# Patient Record
(demographics unavailable — no encounter records)

---

## 2017-07-03 NOTE — ED PDOC
HPI:  Headache


Time Seen by Provider: 17 22:23


Chief Complaint (Nursing): Dizziness/Lightheaded


Chief Complaint (Provider): Headache


History Per: Patient


History/Exam Limitations: no limitations


Onset/Duration Of Symptoms: Days (x4), Gradual, Persistent


Current Symptoms Are (Timing): Still Present


Associated Symptoms: Photophobia.  denies: Extremity Weakness


Additional Complaint(s): 





53 year old male presents to ED with complaints of persistent migraine 

headaches x4 days and has a history of HTN (compliant with medication). States 

that the throbbing headaches are present in the frontal and occipital areas of 

the head and are not thunderclap headaches. (+) photophobia and dizziness, (-) 

numbness, weakness, neck stiffness, chest pain, or SOB. 





PCP: Erika Issa





Past Medical History


Reviewed: Historical Data, Nursing Documentation, Vital Signs


Vital Signs: 





 Last Vital Signs











Temp  98.2 F   17 22:22


 


Pulse  73   17 22:59


 


Resp  18   17 22:22


 


BP  117/116 H  17 22:59


 


Pulse Ox  100   17 22:22














- Medical History


PMH: HTN, Hypercholesterolemia, Hyperlipidemia





- Surgical History


Surgical History: Tonsillectomy





- Family History


Family History: States: Unknown Family Hx





- Social History


Alcohol: Occasional





- Home Medications


Home Medications: 


 Ambulatory Orders











 Medication  Instructions  Recorded


 


Acetaminophen with Codeine 1 tab PO Q8H #10 tab 05/26/15





[Tylenol with Codeine No. 3 300  





mg-30 mg]  


 


Naproxen [Naprosyn] 500 mg PO Q12H #20 tab 05/26/15


 


Naproxen 375 mg PO Q8 PRN #21 tab 11/07/15


 


oxyCODONE/Acetaminophen [Percocet 1 ea PO Q6 PRN #10 tab 11/07/15





5/325 mg Tab]  


 


Famotidine [Pepcid] 20 mg PO Q12 #14 tab 12/15/16


 


traMADol [Ultram] 50 mg PO Q6 PRN #12 tab 12/15/16


 


Cyclobenzaprine [Cyclobenzaprine 10 mg PO BID PRN #10 tab 16





HCl]  


 


Naproxen [Naprosyn] 500 mg PO Q12 PRN #14 tablet 16


 


Ketorolac Tromethamine [Toradol] 10 mg PO Q6 #20 tab 17














- Allergies


Allergies/Adverse Reactions: 


 Allergies











Allergy/AdvReac Type Severity Reaction Status Date / Time


 


No Known Allergies Allergy   Verified 12/15/16 19:44














Review of Systems


ROS Statement: Except As Marked, All Systems Reviewed And Found Negative


Constitutional: Negative for: Fever


Eyes: Positive for: Other (Photophobia)


Cardiovascular: Negative for: Chest Pain


Respiratory: Negative for: Shortness of Breath


Neurological: Positive for: Headache, Dizziness.  Negative for: Weakness, 

Numbness





Physical Exam





- Reviewed


Nursing Documentation Reviewed: Yes


Vital Signs Reviewed: Yes





- Physical Exam


Appears: Positive for: Non-toxic, No Acute Distress


Head Exam: Positive for: ATRAUMATIC


Skin: Positive for: Normal Color, Warm, Dry


Eye Exam: Positive for: Normal appearance, EOMI, PERRL


Neck: Positive for: Normal, Painless ROM, Supple


Cardiovascular/Chest: Positive for: Regular Rate, Rhythm.  Negative for: Murmur


Respiratory: Positive for: Normal Breath Sounds.  Negative for: Respiratory 

Distress


Gastrointestinal/Abdominal: Positive for: Soft.  Negative for: Tenderness


Extremity: Positive for: Normal ROM.  Negative for: Deformity


Neurologic/Psych: Positive for: Alert, CNs II-XII (intact), Oriented, 

Cerebellar Tests (intact), Gait (steady).  Negative for: Motor/Sensory Deficits





- Laboratory Results


Result Diagrams: 


 17 22:58





 17 22:58





- ECG


O2 Sat by Pulse Oximetry: 100 (RA)


Pulse Ox Interpretation: Normal





Medical Decision Making


Medical Decision Makin


Initial impression: migraine headache and HTN


Initial plan:


* CT HEAD


* EKG


* Labs


* Trop I


* Reglan 10mg IVP


* Toradol 15mg IVP


* Re-eval





[id number]


CT FINDINGS:


Brain: No intracranial hemorrhage. No mass. Few scattered subtle foci of 

decreased attenuation


within periventricular/subcortical white matter. No definite edema.


Ventricles: No hydrocephalus.


Bones/joints: No acute fracture.


Soft tissues: Unremarkable.


Sinuses: Scattered mild mucosal thickening. Tiny RIGHT maxillary retention cyst.


Mastoid air cells: Minimal partial opacification of RIGHT mastoid, stable.


Orbits: Unremarkable as visualized.


IMPRESSION:


1. Nonspecific white matter changes. Acute infarction may be CT occult within 

first 24 hours. If a


focal deficit persists, consider followup CT or MRI for further evaluation.


2. Incidental/non-acute findings are described above.





0142


Upon re-evaluation, patient is feeling much better and is medically stable and 

ready for discharge. Counseling has been provided and patient is in agreement. 

Return if symptoms persist or acutely worsen.  Instructed to recheck BP in the 

morning before administering AM dose of blood pressure medication. 








Scribe Attestation:


Documented by Siria Delgadillo acting as a scribe for Jacob Brady MD. 





MD Scribe Attestation: 


All medical record entries made by the Scribe were at my direction and 

personally dictated by me. I have reviewed the chart and agree that the record 

accurately reflects my personal performance of the history, physical exam, 

medical decision making, and the department course for this patient. I have 

also personally directed, reviewed, and agree with the discharge instructions 

and disposition.





Disposition





- Clinical Impression


Clinical Impression: 


 Dizziness, Headache, Hypertension





Counseled Patient/Family Regarding: Studies Performed, Diagnosis





- Disposition


Referrals: 


Erika Issa MD [Family Provider] - 


Disposition: Routine/Home


Disposition Time: 01:42


Condition: IMPROVED


Prescriptions: 


Ketorolac Tromethamine [Toradol] 10 mg PO Q6 #20 tab


Instructions:  Migraine Headache (ED), Hypertension (DC)

## 2017-07-03 NOTE — CT
EXAM:

  CT Head Without Intravenous Contrast



CLINICAL HISTORY:

  53 years old, male; Pain; Headache; Headache not specified; Additional info: 

Hypertensive, HA, R/O bleed



TECHNIQUE:

  Axial computed tomography images of the head/brain without intravenous 

contrast.  This CT exam was performed using one or more of the following dose 

reduction techniques:  automated exposure control, adjustment of the mA and/or 

kV according to patient size, and/or use of iterative reconstruction technique.

  Coronal and sagittal reformatted images were created and reviewed.



COMPARISON:

  CT HEAD OR BRAIN W/O CONT 3/26/2014 6:41:25 PM



FINDINGS:

  Brain:  No intracranial hemorrhage.  No mass.  Few scattered subtle foci of 

decreased attenuation within periventricular/subcortical white matter.  No 

definite edema.

  Ventricles:  No hydrocephalus.

  Bones/joints:  No acute fracture.

  Soft tissues:  Unremarkable.

  Sinuses:  Scattered mild mucosal thickening.  Tiny RIGHT maxillary retention 

cyst.

  Mastoid air cells:  Minimal partial opacification of RIGHT mastoid, stable.

  Orbits:  Unremarkable as visualized.



IMPRESSION:     

1.  Nonspecific white matter changes.  Acute infarction may be CT occult within 

first 24 hours.  If a focal deficit persists, consider followup CT or MRI for 

further evaluation.

2.  Incidental/non-acute findings are described above.

## 2017-07-04 NOTE — CARD
--------------- APPROVED REPORT --------------





EKG Measurement

Heart [id number]

SD [id number]

[id number]

[id number]

[id number]



<Conclusion>

Normal sinus rhythm

Normal ECG

## 2017-12-19 NOTE — ED PDOC
HPI: Abdomen


Time Seen by Provider: 12/19/17 07:24


Chief Complaint (Nursing): Abdominal Pain


Chief Complaint (Provider): Abdominal Pain


History Per: Patient


History/Exam Limitations: no limitations


Onset/Duration Of Symptoms: Days (x2)


Current Symptoms Are (Timing): Still Present


Additional Complaint(s): 


Arben Cabezas is a 54 year old male with a past medical history of HTN and 

gastritis who presents to the ED complaining of RLQ pain x2 days. Patient 

states pain began last night and denies history of similar pain. Denies nausea, 

vomiting, diarrhea, and fever. 





PMD: Non-[facility] Provider








Past Medical History


Reviewed: Historical Data, Nursing Documentation, Vital Signs


Vital Signs: 


 Last Vital Signs











Temp  97.6 F   12/19/17 07:26


 


Pulse  82   12/19/17 11:54


 


Resp  16   12/19/17 11:54


 


BP  162/95 H  12/19/17 11:54


 


Pulse Ox  97   12/20/17 13:27














- Medical History


PMH: HTN, Hypercholesterolemia, Hyperlipidemia





- Surgical History


Surgical History: Tonsillectomy





- Family History


Family History: States: Unknown Family Hx





- Home Medications


Home Medications: 


 Ambulatory Orders











 Medication  Instructions  Recorded


 


Acetaminophen with Codeine 1 tab PO Q8H #10 tab 05/26/15





[Tylenol with Codeine No. 3 300  





mg-30 mg]  


 


Naproxen [Naprosyn] 500 mg PO Q12H #20 tab 05/26/15


 


Naproxen 375 mg PO Q8 PRN #21 tab 11/07/15


 


oxyCODONE/Acetaminophen [Percocet 1 ea PO Q6 PRN #10 tab 11/07/15





5/325 mg Tab]  


 


Famotidine [Pepcid] 20 mg PO Q12 #14 tab 12/15/16


 


traMADol [Ultram] 50 mg PO Q6 PRN #12 tab 12/15/16


 


Cyclobenzaprine [Cyclobenzaprine 10 mg PO BID PRN #10 tab 12/22/16





HCl]  


 


Naproxen [Naprosyn] 500 mg PO Q12 PRN #14 tablet 12/22/16


 


Ketorolac Tromethamine [Toradol] 10 mg PO Q6 #20 tab 07/04/17


 


Docusate [Colace] 100 mg PO BID PRN #10 cap 12/19/17














- Allergies


Allergies/Adverse Reactions: 


 Allergies











Allergy/AdvReac Type Severity Reaction Status Date / Time


 


No Known Allergies Allergy   Verified 12/15/16 19:44














Review of Systems


ROS Statement: Except As Marked, All Systems Reviewed And Found Negative


Constitutional: Negative for: Fever


Gastrointestinal: Positive for: Abdominal Pain (RLQ).  Negative for: Nausea, 

Vomiting, Diarrhea





Physical Exam





- Reviewed


Nursing Documentation Reviewed: Yes


Vital Signs Reviewed: Yes





- Physical Exam


Appears: Positive for: Well, Non-toxic, No Acute Distress


Skin: Positive for: Normal Color, Warm, Dry


Neck: Positive for: Normal


Cardiovascular/Chest: Positive for: Regular Rate, Rhythm.  Negative for: Murmur


Respiratory: Positive for: Normal Breath Sounds.  Negative for: Respiratory 

Distress


Gastrointestinal/Abdominal: Positive for: Tenderness (RLQ tender to palpation)


Extremity: Positive for: Normal ROM


Neurologic/Psych: Positive for: Alert, Oriented (x3)





- Laboratory Results


Result Diagrams: 


 12/19/17 07:50





 12/19/17 07:50





- ECG


O2 Sat by Pulse Oximetry: 97 (RA)


Pulse Ox Interpretation: Normal





Medical Decision Making


Medical Decision Making: 


Time: 07:48


Initial Impression: Abdominal pain r/o appendicitis 


Plan:


--CT Abd, pelvis PO & IV contrast


--CMP


--Lipase


--CBC w/ differential


--Morphine 4 mg IV


--Sodium Chloride 0.9% 1,000 ml IV


--Iohexol 50 mg PO


--Urine C&S


--Urinalysis


--Reevaluation





0930


pt sleeping in bed in NAD





CT see full report. no appendicitis seen. only constipation.





Time: 12:30


--Patient is tolerating PO and results of study were discussed. Upon provider 

evaluation patient is medically stable, and requires no further treatment in 

the ED at this time. Patient will be discharged with Rx for Colace. Counseling 

was provided and all questions were answered regarding diagnosis and need for 

follow up with PMD. There is agreement to discharge plan. Return if symptoms 

persist or worsen.








--------------------------------------------------------------------------------

-----------------


Scribe Attestation:


Documented by Ar Bailey, acting as a scribe for Keisha Wise MD.





Provider Scribe Attestation:


All medical record entries made by the Scribe were at my direction and 

personally dictated by me. I have reviewed the chart and agree that the record 

accurately reflects my personal performance of the history, physical exam, 

medical decision making, and the department course for this patient. I have 

also personally directed, reviewed, and agree with the discharge instructions 

and disposition.





Disposition





- Clinical Impression


Clinical Impression: 


 Abdominal pain, Constipation








- Patient ED Disposition


Is Patient to be Admitted: No


Counseled Patient/Family Regarding: Studies Performed, Diagnosis, Need For 

Followup, Rx Given





- Disposition


Referrals: 


[facility] [Outside]


[facility] [Outside]


Disposition: Routine/Home


Disposition Time: 12:30


Condition: IMPROVED


Additional Instructions: 


follow up with your primary doctor in 1-2 days


return to the ED with any worsening or concerning symptoms 


Prescriptions: 


Docusate [Colace] 100 mg PO BID PRN #10 cap


 PRN Reason: Constipation


Instructions:  Constipation (ED)


Forms:  [facility] (English)

## 2018-03-16 NOTE — CT
PROCEDURE:  CT Abdomen and Pelvis with contrast



HISTORY:

Right lower quadrant pain 



COMPARISON:

None.



TECHNIQUE:

CT scan of the abdomen and pelvis was performed after intravenous 

administration of contrast. Oral contrast was administered.  Coronal 

and sagittal reformatted images were obtained. 



Contrast dose: 95 cc Omnipaque 300



Radiation dose:



Total exam DLP = 940.70 mGy-cm.



This CT exam was performed using one or more of the following dose 

reduction techniques: Automated exposure control, adjustment of the 

mA and/or kV according to patient size, and/or use of iterative 

reconstruction technique.



FINDINGS:



LOWER THORAX:

There is bibasilar subsegmental atelectasis. 



LIVER:

Normal in size with homogeneous enhancement. No gross lesion or 

ductal dilatation. There is non contiguous curvilinear high 

attenuation in the anterior inferior left hepatic lobe which may 

represent vascular enhancement or nonspecific calcifications.



GALLBLADDER AND BILE DUCTS:

There are no calcified gallstones. 



PANCREAS:

Normal in size with homogeneous enhancement. No gross lesion or 

ductal dilatation.



SPLEEN:

The spleen is normal in size with homogeneous enhancement.  A tiny 3 

mm low-attenuation lesion in the inferior pole posteriorly is too 

small to characterize by CT criteria. 



ADRENALS:

No discrete nodule. 



KIDNEYS AND URETERS:

Both kidneys are normal in size and there is homogeneous enhancement 

without hydronephrosis or focal mass. . No hydronephrosis. No solid 

mass. 



VASCULATURE:

No aortic aneurysm. 



BOWEL:

The small bowel loops are normal in caliber. There is fecalization of 

the distal small bowel loops. There is large amount of stool in the 

colon with fecal stasis in the rectum. No bowel dilatation or 

obstruction 



APPENDIX:

Normal appendix. 



PERITONEUM:

No free fluid. No free air. 



LYMPH NODES:

No enlarged lymph nodes. 



BLADDER:

The urinary bladder is partially distended and there is apparent 

moderate circumferential mural thickening of the bladder wall, most 

conspicuous anteriorly. 



REPRODUCTIVE:

There is mild enlargement of the prostate gland. 



BONES:

No acute fracture. There is mild multilevel degenerative disc 

disease. Focal areas of sclerosis in the posterior superior T9 

vertebral body and inferior T2 vertebral body statistically most 

compatible with a bone island. 



OTHER FINDINGS:

There are bilateral small fat containing inguinal hernias.



IMPRESSION:

1. Constipation and fecalization of distal small bowel contents 

consistent with chronic stasis. No evidence of bowel dilatation or 

obstruction. 



2. No CT evidence for acute appendicitis. 



3. Apparent moderate mural thickening of the urinary bladder wall, 

most conspicuous anteriorly is nonspecific and could be related to 

underdistention however cystitis cannot be excluded.  Please 

correlate with urine analysis.
verbalization